# Patient Record
Sex: MALE | Race: OTHER | HISPANIC OR LATINO | URBAN - METROPOLITAN AREA
[De-identification: names, ages, dates, MRNs, and addresses within clinical notes are randomized per-mention and may not be internally consistent; named-entity substitution may affect disease eponyms.]

---

## 2017-07-31 ENCOUNTER — EMERGENCY (EMERGENCY)
Facility: HOSPITAL | Age: 47
LOS: 1 days | Discharge: HOME | End: 2017-07-31
Attending: EMERGENCY MEDICINE
Payer: COMMERCIAL

## 2017-07-31 VITALS
OXYGEN SATURATION: 99 % | WEIGHT: 160.06 LBS | RESPIRATION RATE: 13 BRPM | DIASTOLIC BLOOD PRESSURE: 85 MMHG | TEMPERATURE: 98 F | SYSTOLIC BLOOD PRESSURE: 126 MMHG | HEART RATE: 82 BPM | HEIGHT: 69 IN

## 2017-07-31 DIAGNOSIS — S61.012A LACERATION WITHOUT FOREIGN BODY OF LEFT THUMB WITHOUT DAMAGE TO NAIL, INITIAL ENCOUNTER: ICD-10-CM

## 2017-07-31 DIAGNOSIS — Z88.0 ALLERGY STATUS TO PENICILLIN: ICD-10-CM
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
## 2017-07-31 PROCEDURE — 12001 RPR S/N/AX/GEN/TRNK 2.5CM/<: CPT

## 2017-07-31 PROCEDURE — 99282 EMERGENCY DEPT VISIT SF MDM: CPT | Mod: 25

## 2017-07-31 PROCEDURE — 99284 EMERGENCY DEPT VISIT MOD MDM: CPT | Mod: 25

## 2017-07-31 RX ORDER — LIDOCAINE HCL 20 MG/ML
5 VIAL (ML) INJECTION ONCE
Qty: 0 | Refills: 0 | Status: COMPLETED | OUTPATIENT
Start: 2017-07-31 | End: 2017-07-31

## 2017-07-31 RX ADMIN — Medication 5 MILLILITER(S): at 23:50

## 2017-07-31 NOTE — ED PROVIDER NOTE - OBJECTIVE STATEMENT
46 y/o M pt w/ no significant PMHx presents to ED c/o laceration L 1st finger s/p injury today. Pt states that he was cleaning a plate today when the plate broke in half, cutting his L thumb. Pt denies fever, chills, or any other complaints. Last tetanus unknown per pt. Pt is allergic to Penicillin (Unknown).

## 2017-07-31 NOTE — ED ADULT NURSE NOTE - OBJECTIVE STATEMENT
pt. in er with lac to left great finger states  he was washing dishes when plate broke and cut his finger

## 2017-07-31 NOTE — ED PROVIDER NOTE - CARE PLAN
Principal Discharge DX:	Laceration of right thumb without foreign body with damage to nail, initial encounter

## 2017-11-15 ENCOUNTER — EMERGENCY (EMERGENCY)
Facility: HOSPITAL | Age: 47
LOS: 1 days | Discharge: HOME | End: 2017-11-15
Attending: EMERGENCY MEDICINE
Payer: COMMERCIAL

## 2017-11-15 VITALS
DIASTOLIC BLOOD PRESSURE: 78 MMHG | TEMPERATURE: 98 F | RESPIRATION RATE: 16 BRPM | OXYGEN SATURATION: 100 % | HEART RATE: 64 BPM | SYSTOLIC BLOOD PRESSURE: 146 MMHG

## 2017-11-15 VITALS
HEIGHT: 69 IN | RESPIRATION RATE: 17 BRPM | HEART RATE: 65 BPM | OXYGEN SATURATION: 100 % | DIASTOLIC BLOOD PRESSURE: 80 MMHG | WEIGHT: 158.07 LBS | SYSTOLIC BLOOD PRESSURE: 156 MMHG | TEMPERATURE: 98 F

## 2017-11-15 DIAGNOSIS — Z88.0 ALLERGY STATUS TO PENICILLIN: ICD-10-CM

## 2017-11-15 DIAGNOSIS — H10.9 UNSPECIFIED CONJUNCTIVITIS: ICD-10-CM

## 2017-11-15 DIAGNOSIS — H57.8 OTHER SPECIFIED DISORDERS OF EYE AND ADNEXA: ICD-10-CM

## 2017-11-15 PROCEDURE — 99284 EMERGENCY DEPT VISIT MOD MDM: CPT

## 2017-11-15 PROCEDURE — 99283 EMERGENCY DEPT VISIT LOW MDM: CPT

## 2017-11-15 RX ORDER — KETOROLAC TROMETHAMINE 0.5 %
1 DROPS OPHTHALMIC (EYE)
Qty: 1 | Refills: 0 | OUTPATIENT
Start: 2017-11-15 | End: 2017-11-25

## 2017-11-15 RX ORDER — KETOROLAC TROMETHAMINE 0.5 %
1 DROPS OPHTHALMIC (EYE) THREE TIMES A DAY
Qty: 0 | Refills: 0 | Status: DISCONTINUED | OUTPATIENT
Start: 2017-11-15 | End: 2017-11-19

## 2017-11-15 RX ADMIN — Medication 1 DROP(S): at 13:42

## 2017-11-15 NOTE — ED PROVIDER NOTE - BILATERAL EYES
pupils equal, round, and reactive to light/Conjunctiva slightly injected b/l , no watery discharge noted, no foreign body noted

## 2017-11-15 NOTE — ED ADULT NURSE NOTE - OBJECTIVE STATEMENT
as per patient  some fluid spill on his r eye yesterday while  cleaning garbage yesterday  feeling some discomfort, denies any vision changes.

## 2017-11-15 NOTE — ED PROVIDER NOTE - OBJECTIVE STATEMENT
46 y/o M w/ no significant PMHx presents to ED c/o R eye discomfort and irritation. Pt reports that something flew into his eye yesterday, not sure what but has felt discomfort since. Pt denies pain, tearing, vision changes, or any other complaints. Pt normally uses glasses to read. NKDA. Pt's pharmacy is [id number]. Pt is allergic to Penicillin (unknown).

## 2024-08-17 NOTE — ED ADULT NURSE NOTE - CADM POA URETHRAL CATHETER
Patient is awake and withdrawn to room. Patient did come out for snack time and was bright on approach. Ate cheese, lays chips, a fig bar and drank a ginger ale. Patient denies all psych s/s at this time. Medication compliant and cooperative with care. Safety precautions maintained.   No
